# Patient Record
(demographics unavailable — no encounter records)

---

## 2024-12-05 NOTE — PLAN
[FreeTextEntry1] : Pt is a 33yo  @6w2d by LMP presenting w/ vaginal bleeding. TVUS w/ IUP, no cardiac activity visualized at this time. DDx includes threatened ab vs early IUP w/ subchorionic hematoma.  #threatened  -plan for hcg, progesterone, CBC, CMP, T&S today -plan for repeat hcg in 48hrs (at outside lab) -discussed possibility of threatened  vs bleeding 2/2 subchorionic hematoma -counseled pt on ED precautions in setting of heavy vaginal bleeding (>2 pads/hr for 2hrs) -pelvic rest & no heavy lifting advised -pt to RTO in 1 week for MD visit & shashanko

## 2024-12-05 NOTE — HISTORY OF PRESENT ILLNESS
[FreeTextEntry1] : Pt is a 33yo  LMP 10/21 (6w3d) presenting today with vaginal bleeding in setting of early pregnancy. Pt had multiple positive home pregnancy tests. This morning pt began experiencing cramping & vaginal spotting which has increased throughout the day. This is a desired pregnancy. Denies fevers, chills, CP/SOB, abdominal pain, vaginal discharge.  TVUS today: IUP visualized w/ GS & FP, no YS noted, no cardiac activity noted. CRL measuring 2.5mm ~5w6d. CL is 3.09cm w/o funneling. RO w/ corpus luteum cyst. Fundal anterior myoma subserosal noted. Subchorionic hematoma 0.44x0.23cm noted.

## 2024-12-05 NOTE — PHYSICAL EXAM
[Appropriately responsive] : appropriately responsive [Alert] : alert [No Acute Distress] : no acute distress [Soft] : soft [Non-tender] : non-tender [Non-distended] : non-distended [No Lesions] : no lesions [No Mass] : no mass [Oriented x3] : oriented x3 [Labia Majora] : normal [Labia Minora] : normal [Scant] : There was scant vaginal bleeding [Normal] : normal

## 2024-12-17 NOTE — PLAN
[FreeTextEntry1] : #Missed AB - counseled pt that she can conceive at any time, however recommend waiting for next menses to better date the next pregnancy - pt desires to check TSH, T4 today given history of thyroid nodules; ordered - HCG levels checked today, repeat in 2-4wks order placed - Recommended to schedule an "early viability scan" at 6 or 7 weeks in next pregnancy  RTO PRN.

## 2024-12-17 NOTE — PHYSICAL EXAM
[Chaperone Present] : A chaperone was present in the examining room during all aspects of the physical examination [Appropriately responsive] : appropriately responsive [Alert] : alert [No Acute Distress] : no acute distress [Soft] : soft [Non-tender] : non-tender [Non-distended] : non-distended [No Lesions] : no lesions [No Mass] : no mass [FreeTextEntry2] :  Garret arteaga)

## 2024-12-17 NOTE — COUNSELING
[Nutrition/ Exercise/ Weight Management] : nutrition, exercise, weight management [Contraception/ Emergency Contraception/ Safe Sexual Practices] : contraception, emergency contraception, safe sexual practices [Preconception Care/ Fertility options] : preconception care, fertility options [Confidentiality] : confidentiality

## 2024-12-17 NOTE — HISTORY OF PRESENT ILLNESS
[FreeTextEntry1] : 2024 .MARIPOSA WAGNER 32 year old female  LMP 10/21/24 presents for a f/u s/p missed . Pt reports light cramping, denies heavy bleeding, fevers, or abdominal pain for the past 2 weeks.  HCG ():1378 HCG (): 151  TVUS performed today: EML 5.33mm, no IUP visualized, no retained POCs noted, both ovaries wnl.

## 2025-04-15 NOTE — PLAN
[FreeTextEntry1] : 33-year-old female presents for follow-up visit, for delayed menses: Reviewed today's sono w/ pt Quant progesterone drawn today Advised pt to call MD if she experiences severe pain, fever, heavy vaginal bleeding, blurry vision or severe headaches that don't resolve w/ rest, hydration and Tylenol F/u in 2 wks for sono/MD visit

## 2025-04-15 NOTE — HISTORY OF PRESENT ILLNESS
[FreeTextEntry1] : 04/15/2025. MARIPOSA WAGNER 33-year-old female  presents for follow-up visit, for delayed menses. Accompanied by parent.  Patient s/p mAB in  at 6 wks. She reports since then, menstrual cycles have been irregular. LMP 3/3/25.  Today's sono: 7.3mm early intrauterine gestational sac visualized w/ a normal yolk sac, no fetal pole visualized, no free fluid in CDS, normal ovaries, possible subchorionic hematoma 0.29 x 0.3 x 0.18cm.  GynHx: denies PMH: denies SHx: appendectomy FHx: mother w/ early-stage fallopian cancer, s/p hysterectomy. Denies FHx of breast, ovarian, or colon cancer.

## 2025-04-15 NOTE — SIGNATURES
[TextEntry] : This note was authored by Kristie Hernandez working as a scribe for Dr. Christofer Tam.   I, Dr. Christofer Tam, have reviewed the content of this note and confirm it is true and accurate. I personally performed the history and physical examination and made all the decisions. 04/15/2025

## 2025-05-12 NOTE — COUNSELING
[Preconception Care/ Fertility options] : preconception care, fertility options [Confidentiality] : confidentiality

## 2025-05-12 NOTE — PLAN
[FreeTextEntry1] : 33 year old MARIPOSA WAGNER pt presents for spotting at 9-week pregnancy denys.  Missed Ab at 7+ weeks: D&C procedure scheduled r/b/a d/w pt and pts' mom who wish asap. Reviewed pelvic sono done today  Pt will get work up for recurrent SAB:  Pt to schedule saline sonohysterogram after menses finishes and thrombophilia w/u when has period and will get karyotype for pt and pt;'s .    RTO PRN  I, Fernanda Rdz, am scribing for and in the presence of LILIYA Jeffery M.D. in the following sections: HISTORY OF PRESENT ILLNESS, PAST MEDICAL/FAMILY/SOCIAL HISTORY, REVIEW OF SYSTEMS, PHYSICAL EXAM, ASSESSMENT/PLAN.

## 2025-05-12 NOTE — HISTORY OF PRESENT ILLNESS
[FreeTextEntry1] : 33 year old MARIPOSA WAGNER pt presents for spotting at 9-week pregnancy denys.   She reports spotting started last night at 9 weeks pregnancy denys. She desires a D&C. Sono report shows fetus stopped growing at 7-week denys. Pt reports she has fibroid 8mm subserosal .  Pt has hx of SAB early at 5-6 wks and passed on its own.

## 2025-05-12 NOTE — END OF VISIT
[FreeTextEntry3] : I personally performed the services described in the documentation, reviewed the documentation recorded by the scribe in my presence and it accurately and completely records my words and actions. I personally performed the history and physical examination and made all the decisions.

## 2025-05-12 NOTE — PHYSICAL EXAM
[Appropriately responsive] : appropriately responsive [Alert] : alert [No Acute Distress] : no acute distress [No Lymphadenopathy] : no lymphadenopathy [Soft] : soft [Non-tender] : non-tender [Non-distended] : non-distended [No HSM] : No HSM [No Lesions] : no lesions [No Mass] : no mass [Oriented x3] : oriented x3 [Labia Majora] : normal [Labia Minora] : normal [Normal] : normal [Uterine Adnexae] : normal [FreeTextEntry2] : Fernanda Rdz [FreeTextEntry1] : deanna [FreeTextEntry5] : spotting brown

## 2025-06-02 NOTE — HISTORY OF PRESENT ILLNESS
[FreeTextEntry1] : 33 year old MARIPOSA WAGNER pt presents for post-op on D&C due to missed ab.     She feels well and offers no complaints She reports bleeding stopped the same week as procedure. Her first miscarriage she did not do a D&C. Pt is interested in doing IVF.  Pt reports she is allergic to steroid cream for eczema. (Cordran)

## 2025-06-02 NOTE — PHYSICAL EXAM
[Appropriately responsive] : appropriately responsive [Alert] : alert [No Acute Distress] : no acute distress [Oriented x3] : oriented x3 [Soft] : soft [Non-tender] : non-tender [Non-distended] : non-distended [No HSM] : No HSM [No Lesions] : no lesions [No Mass] : no mass [Labia Majora] : normal [Labia Minora] : normal [Normal] : normal [Uterine Adnexae] : normal [FreeTextEntry2] : Fernanda Rdz (scribe)

## 2025-06-02 NOTE — PLAN
[FreeTextEntry1] : 33 year old MARIPOSA WAGNER pt presents for post-op D&C.  Preconception Counseling: Advised to schedule saline sonohysterogram after menses cycle is back between day 5-12 Recommended pt to take Tylenol or Advil with food 1 hour before procedure Pt had karyotype and thrombophilia w/u Fertility MD referral given   RTO in 1 year or PRN when pregnant  Fernanda COUGHLIN am scribing for and in the presence of KODY Burch M.D. in the following sections: HISTORY OF PRESENT ILLNESS, PAST MEDICAL/FAMILY/SOCIAL HISTORY, REVIEW OF SYSTEMS, PHYSICAL EXAM, ASSESSMENT/PLAN.